# Patient Record
Sex: FEMALE | Race: WHITE
[De-identification: names, ages, dates, MRNs, and addresses within clinical notes are randomized per-mention and may not be internally consistent; named-entity substitution may affect disease eponyms.]

---

## 2018-05-06 ENCOUNTER — HOSPITAL ENCOUNTER (EMERGENCY)
Dept: HOSPITAL 39 - ER | Age: 33
Discharge: HOME | End: 2018-05-06
Payer: SELF-PAY

## 2018-05-06 VITALS — SYSTOLIC BLOOD PRESSURE: 152 MMHG | TEMPERATURE: 97.6 F | DIASTOLIC BLOOD PRESSURE: 94 MMHG

## 2018-05-06 VITALS — OXYGEN SATURATION: 100 %

## 2018-05-06 DIAGNOSIS — Z72.51: ICD-10-CM

## 2018-05-06 DIAGNOSIS — N30.90: Primary | ICD-10-CM

## 2018-05-06 PROCEDURE — 81001 URINALYSIS AUTO W/SCOPE: CPT

## 2018-05-06 PROCEDURE — 87591 N.GONORRHOEAE DNA AMP PROB: CPT

## 2018-05-06 PROCEDURE — 87086 URINE CULTURE/COLONY COUNT: CPT

## 2018-05-06 PROCEDURE — 87491 CHLMYD TRACH DNA AMP PROBE: CPT

## 2018-05-06 PROCEDURE — 81025 URINE PREGNANCY TEST: CPT

## 2018-05-06 NOTE — ED.PDOC
History of Present Illness





- General


Stated Complaint: bladder pain and vaginal discharge


Time Seen by Provider: 05/06/18 16:57


Source: patient


Exam Limitations: no limitations





- History of Present Illness


Initial Comments: 





Patient presents with bladder pain for three days. Denies dysuria/anuria/

frequency/hematuria.  She said she was having unprotected sex just before 

presentation and her partner noticed white vaginal discharge. She is afraid she 

may have an STI.  No other complaints. 


Timing/Duration: other - 3 days


Severity: mild


Improving Factors: nothing


Worsening Factors: nothing


Associated Symptoms: denies symptoms


Allergies/Adverse Reactions: 


Allergies





NO KNOWN ALLERGY Allergy (Verified 05/06/18 17:08)


 








Home Medications: 


Ambulatory Orders





NK [NK]  05/06/18 











Review of Systems





- Review of Systems


Constitutional: States: no symptoms reported


EENTM: States: no symptoms reported


Respiratory: States: no symptoms reported


Cardiology: States: no symptoms reported


Gastrointestinal/Abdominal: States: no symptoms reported


Genitourinary: States: see HPI


Musculoskeletal: States: no symptoms reported


Skin: States: no symptoms reported


Neurological: States: no symptoms reported


Endocrine: States: no symptoms reported


Hematologic/Lymphatic: States: no symptoms reported





Family Medical History





- Family History


  ** Mother


Family History: Unknown


Living Status: Unknown





Physical Exam





- Physical Exam


General Appearance: Alert


Respiratory: chest non-tender, lungs clear, normal breath sounds


Cardiovascular/Chest: normal peripheral pulses, regular rate, rhythm, no edema


Gastrointestinal/Abdominal: normal bowel sounds, non tender, soft, other - 

bladder NTTP, non-distended


Skin Exam: normal color





Progress





- Progress


Progress: 





05/06/18 18:58


UA showe wbcs , small LE, and 1+ bacteria. She was treated empirically with 

Azithromycin 1 gram po x one and Rocephin 250 mg IM x one for G/C chlamydia.  

She was instructed to abstain from sexual intercourse until her G/C results 

were back and to have her parnter tested. Questions were elicited and answered. 

Patient voiced understanding and agreement with the plan.  She refused an HIV 

test. She was encouraged to get an HIV test as well.





 Laboratory Tests











  05/06/18 05/06/18





  17:01 17:32


 


Urine Color   Yellow


 


Urine Appearance   Cloudy


 


Urine pH   5.5


 


Ur Specific Gravity   >= 1.030


 


Urine Protein   Trace


 


Urine Glucose (UA)   Negative


 


Urine Ketones   Negative


 


Urine Blood   Trace-lysed H


 


Urine Nitrite   Negative


 


Urine Bilirubin   Negative


 


Urine Urobilinogen   0.2


 


Ur Leukocyte Esterase   Small H


 


Urine RBC   0-1


 


Urine WBC   20-30 H


 


Ur Epithelial Cells   1-3


 


Calcium Oxalate Crystal   1+


 


Urine Bacteria   1+


 


Urine HCG, Qual  Negative 











05/06/18 19:05








Departure





- Departure


Clinical Impression: 


 Cystitis, High risk sexual behavior





Disposition: Discharge to Home or Self Care


Condition: Good


Diet: resume usual diet


Activity: increase activity as tolerated


Home Medications: 


Ambulatory Orders





NK [NK]  05/06/18 








Additional Instructions: 


You are encouraged to have you and your partner tested for HIV.  Follow up with 

your regular doctor in 2 weeks to be retested for cure.